# Patient Record
(demographics unavailable — no encounter records)

---

## 2025-07-14 NOTE — PHYSICAL EXAM
[Alert] : alert [Oriented x 3] : ~L oriented x 3 [Well Nourished] : well nourished [Conjunctiva Non-injected] : conjunctiva non-injected [No Visual Lymphadenopathy] : no visual  lymphadenopathy [No Clubbing] : no clubbing [No Edema] : no edema [No Bromhidrosis] : no bromhidrosis [No Chromhidrosis] : no chromhidrosis [FreeTextEntry3] : tan skin distal to right elbow is a dark grey black macule 2 mm right mid to distal forearm thin scaly pinkish red papule scattered brown macules

## 2025-07-14 NOTE — HISTORY OF PRESENT ILLNESS
[FreeTextEntry1] : spot on arm  [de-identified] : NPA here for above has been scratching at it, thought was psoriasis no hx skin cancer  also new dark mole right arm x 1 yr   going to [place] for work, has store in [address]